# Patient Record
Sex: MALE | Race: OTHER | HISPANIC OR LATINO | URBAN - METROPOLITAN AREA
[De-identification: names, ages, dates, MRNs, and addresses within clinical notes are randomized per-mention and may not be internally consistent; named-entity substitution may affect disease eponyms.]

---

## 2021-12-13 ENCOUNTER — EMERGENCY (EMERGENCY)
Facility: HOSPITAL | Age: 26
LOS: 1 days | Discharge: HOME | End: 2021-12-13
Attending: EMERGENCY MEDICINE
Payer: MEDICAID

## 2021-12-13 VITALS
RESPIRATION RATE: 18 BRPM | HEART RATE: 121 BPM | DIASTOLIC BLOOD PRESSURE: 102 MMHG | OXYGEN SATURATION: 95 % | SYSTOLIC BLOOD PRESSURE: 176 MMHG

## 2021-12-13 LAB
ALP SERPL-CCNC: 118 U/L — SIGNIFICANT CHANGE UP (ref 40–120)
ALT FLD-CCNC: 25 U/L — SIGNIFICANT CHANGE UP (ref 10–45)
ANION GAP SERPL CALC-SCNC: 18 MMOL/L — HIGH (ref 5–17)
APTT BLD: 29.5 SEC — SIGNIFICANT CHANGE UP (ref 27.5–35.5)
AST SERPL-CCNC: 21 U/L — SIGNIFICANT CHANGE UP (ref 10–40)
BASOPHILS # BLD AUTO: 0.03 K/UL — SIGNIFICANT CHANGE UP (ref 0–0.2)
BASOPHILS NFR BLD AUTO: 0.3 % — SIGNIFICANT CHANGE UP (ref 0–2)
BILIRUB SERPL-MCNC: 1 MG/DL — SIGNIFICANT CHANGE UP (ref 0.2–1.2)
BUN SERPL-MCNC: 13 MG/DL — SIGNIFICANT CHANGE UP (ref 7–23)
CALCIUM SERPL-MCNC: 9.4 MG/DL — SIGNIFICANT CHANGE UP (ref 8.4–10.5)
CHLORIDE SERPL-SCNC: 103 MMOL/L — SIGNIFICANT CHANGE UP (ref 96–108)
CO2 SERPL-SCNC: 22 MMOL/L — SIGNIFICANT CHANGE UP (ref 22–31)
CREAT SERPL-MCNC: 0.7 MG/DL — SIGNIFICANT CHANGE UP (ref 0.5–1.3)
EOSINOPHIL # BLD AUTO: 0.17 K/UL — SIGNIFICANT CHANGE UP (ref 0–0.5)
EOSINOPHIL NFR BLD AUTO: 1.7 % — SIGNIFICANT CHANGE UP (ref 0–6)
ETHANOL SERPL-MCNC: 113 MG/DL — HIGH (ref 0–10)
GLUCOSE SERPL-MCNC: 81 MG/DL — SIGNIFICANT CHANGE UP (ref 70–99)
HCT VFR BLD CALC: 48.4 % — SIGNIFICANT CHANGE UP (ref 39–50)
HGB BLD-MCNC: 16.6 G/DL — SIGNIFICANT CHANGE UP (ref 13–17)
IMM GRANULOCYTES NFR BLD AUTO: 0.2 % — SIGNIFICANT CHANGE UP (ref 0–1.5)
INR BLD: 1.01 RATIO — SIGNIFICANT CHANGE UP (ref 0.88–1.16)
LIDOCAIN IGE QN: 26 U/L — SIGNIFICANT CHANGE UP (ref 7–60)
LYMPHOCYTES # BLD AUTO: 3.83 K/UL — HIGH (ref 1–3.3)
LYMPHOCYTES # BLD AUTO: 37.4 % — SIGNIFICANT CHANGE UP (ref 13–44)
MCHC RBC-ENTMCNC: 28.8 PG — SIGNIFICANT CHANGE UP (ref 27–34)
MCHC RBC-ENTMCNC: 34.3 GM/DL — SIGNIFICANT CHANGE UP (ref 32–36)
MCV RBC AUTO: 84 FL — SIGNIFICANT CHANGE UP (ref 80–100)
MONOCYTES # BLD AUTO: 0.69 K/UL — SIGNIFICANT CHANGE UP (ref 0–0.9)
MONOCYTES NFR BLD AUTO: 6.7 % — SIGNIFICANT CHANGE UP (ref 2–14)
NEUTROPHILS # BLD AUTO: 5.5 K/UL — SIGNIFICANT CHANGE UP (ref 1.8–7.4)
NEUTROPHILS NFR BLD AUTO: 53.7 % — SIGNIFICANT CHANGE UP (ref 43–77)
NRBC # BLD: 0 /100 WBCS — SIGNIFICANT CHANGE UP (ref 0–0)
PLATELET # BLD AUTO: 308 K/UL — SIGNIFICANT CHANGE UP (ref 150–400)
POTASSIUM SERPL-MCNC: 3.6 MMOL/L — SIGNIFICANT CHANGE UP (ref 3.5–5.3)
POTASSIUM SERPL-SCNC: 3.6 MMOL/L — SIGNIFICANT CHANGE UP (ref 3.5–5.3)
PROT SERPL-MCNC: 8.4 G/DL — HIGH (ref 6–8.3)
PROTHROM AB SERPL-ACNC: 12.1 SEC — SIGNIFICANT CHANGE UP (ref 10.6–13.6)
RBC # BLD: 5.76 M/UL — SIGNIFICANT CHANGE UP (ref 4.2–5.8)
RBC # FLD: 12.6 % — SIGNIFICANT CHANGE UP (ref 10.3–14.5)
SODIUM SERPL-SCNC: 143 MMOL/L — SIGNIFICANT CHANGE UP (ref 135–145)
WBC # BLD: 10.24 K/UL — SIGNIFICANT CHANGE UP (ref 3.8–10.5)
WBC # FLD AUTO: 10.24 K/UL — SIGNIFICANT CHANGE UP (ref 3.8–10.5)

## 2021-12-13 PROCEDURE — 73562 X-RAY EXAM OF KNEE 3: CPT | Mod: 26,50

## 2021-12-13 PROCEDURE — 74177 CT ABD & PELVIS W/CONTRAST: CPT | Mod: 26,MA

## 2021-12-13 PROCEDURE — 73130 X-RAY EXAM OF HAND: CPT | Mod: 26,LT

## 2021-12-13 PROCEDURE — 73080 X-RAY EXAM OF ELBOW: CPT | Mod: 26,RT

## 2021-12-13 PROCEDURE — 70496 CT ANGIOGRAPHY HEAD: CPT | Mod: 26,MA

## 2021-12-13 PROCEDURE — 70450 CT HEAD/BRAIN W/O DYE: CPT | Mod: 26,MA

## 2021-12-13 PROCEDURE — 73090 X-RAY EXAM OF FOREARM: CPT | Mod: 26,LT

## 2021-12-13 PROCEDURE — 72170 X-RAY EXAM OF PELVIS: CPT | Mod: 26

## 2021-12-13 PROCEDURE — 73110 X-RAY EXAM OF WRIST: CPT | Mod: 26,LT

## 2021-12-13 PROCEDURE — 99284 EMERGENCY DEPT VISIT MOD MDM: CPT

## 2021-12-13 PROCEDURE — 73200 CT UPPER EXTREMITY W/O DYE: CPT | Mod: 26,RT,MA

## 2021-12-13 PROCEDURE — 73090 X-RAY EXAM OF FOREARM: CPT | Mod: 26,LT,77

## 2021-12-13 PROCEDURE — 72131 CT LUMBAR SPINE W/O DYE: CPT | Mod: 26,MA

## 2021-12-13 PROCEDURE — 99291 CRITICAL CARE FIRST HOUR: CPT

## 2021-12-13 PROCEDURE — 71260 CT THORAX DX C+: CPT | Mod: 26,MA

## 2021-12-13 PROCEDURE — 71045 X-RAY EXAM CHEST 1 VIEW: CPT | Mod: 26

## 2021-12-13 PROCEDURE — 70498 CT ANGIOGRAPHY NECK: CPT | Mod: 26,MA

## 2021-12-13 PROCEDURE — 72125 CT NECK SPINE W/O DYE: CPT | Mod: 26,MA

## 2021-12-13 RX ORDER — SODIUM CHLORIDE 9 MG/ML
250 INJECTION INTRAMUSCULAR; INTRAVENOUS; SUBCUTANEOUS ONCE
Refills: 0 | Status: COMPLETED | OUTPATIENT
Start: 2021-12-13 | End: 2021-12-13

## 2021-12-13 RX ORDER — MORPHINE SULFATE 50 MG/1
4 CAPSULE, EXTENDED RELEASE ORAL ONCE
Refills: 0 | Status: DISCONTINUED | OUTPATIENT
Start: 2021-12-13 | End: 2021-12-13

## 2021-12-13 RX ORDER — CEPHALEXIN 500 MG
1 CAPSULE ORAL
Qty: 40 | Refills: 0
Start: 2021-12-13 | End: 2021-12-22

## 2021-12-13 RX ORDER — FENTANYL CITRATE 50 UG/ML
50 INJECTION INTRAVENOUS ONCE
Refills: 0 | Status: DISCONTINUED | OUTPATIENT
Start: 2021-12-13 | End: 2021-12-13

## 2021-12-13 RX ORDER — CEFAZOLIN SODIUM 1 G
2000 VIAL (EA) INJECTION ONCE
Refills: 0 | Status: COMPLETED | OUTPATIENT
Start: 2021-12-13 | End: 2021-12-13

## 2021-12-13 RX ORDER — TETANUS TOXOID, REDUCED DIPHTHERIA TOXOID AND ACELLULAR PERTUSSIS VACCINE, ADSORBED 5; 2.5; 8; 8; 2.5 [IU]/.5ML; [IU]/.5ML; UG/.5ML; UG/.5ML; UG/.5ML
0.5 SUSPENSION INTRAMUSCULAR ONCE
Refills: 0 | Status: COMPLETED | OUTPATIENT
Start: 2021-12-13 | End: 2021-12-13

## 2021-12-13 RX ADMIN — MORPHINE SULFATE 4 MILLIGRAM(S): 50 CAPSULE, EXTENDED RELEASE ORAL at 20:50

## 2021-12-13 RX ADMIN — SODIUM CHLORIDE 250 MILLILITER(S): 9 INJECTION INTRAMUSCULAR; INTRAVENOUS; SUBCUTANEOUS at 19:08

## 2021-12-13 RX ADMIN — Medication 100 MILLIGRAM(S): at 19:01

## 2021-12-13 RX ADMIN — Medication 2000 MILLIGRAM(S): at 19:35

## 2021-12-13 RX ADMIN — FENTANYL CITRATE 50 MICROGRAM(S): 50 INJECTION INTRAVENOUS at 19:01

## 2021-12-13 RX ADMIN — SODIUM CHLORIDE 250 MILLILITER(S): 9 INJECTION INTRAMUSCULAR; INTRAVENOUS; SUBCUTANEOUS at 20:15

## 2021-12-13 RX ADMIN — TETANUS TOXOID, REDUCED DIPHTHERIA TOXOID AND ACELLULAR PERTUSSIS VACCINE, ADSORBED 0.5 MILLILITER(S): 5; 2.5; 8; 8; 2.5 SUSPENSION INTRAMUSCULAR at 19:01

## 2021-12-13 RX ADMIN — FENTANYL CITRATE 50 MICROGRAM(S): 50 INJECTION INTRAVENOUS at 00:00

## 2021-12-13 RX ADMIN — MORPHINE SULFATE 4 MILLIGRAM(S): 50 CAPSULE, EXTENDED RELEASE ORAL at 21:51

## 2021-12-13 NOTE — ED PROVIDER NOTE - PHYSICAL EXAMINATION
A: airway patent  B: bilateral breath sounds present  C: radial and DP 2+ and symmetric  D: GCS 15  E: open fracture to left 5th digit, abrasions over bilateral knees    CONSTITUTIONAL: non-toxic, uncomfortable appearing  SKIN: no rash, no petechiae.  EYES: PERRL, EOMI, pink conjunctiva, anicteric  ENT: tongue and uvular midline, no exudates, moist mucous membranes  NECK: c-collar in place; trachea midline, neck supple  CARD: RRR, no murmurs, equal radial pulses bilaterally 2+  RESP: CTAB, no respiratory distress  ABD: Soft, non-tender, non-distended, no peritoneal signs  EXT: left 5th digit wound with bony exposure. Tender over right elbow/forearm. Tender over bilateral knees, FROM.   SPINE: no midline bony tenderness  NEURO: Alert, oriented. Neuro exam nonfocal.  PSYCH: Cooperative, appropriate.

## 2021-12-13 NOTE — ED PROVIDER NOTE - NSFOLLOWUPINSTRUCTIONS_ED_ALL_ED_FT
Follow up with Dr. Allen within 2 weeks  Rest, drink plenty of fluids  Advance activity as tolerated  Continue all previously prescribed medications as directed  Follow up with your PMD - bring copies of your results  Return to the ER for chest pain, difficulty breathing, signs of infection including redness, swelling, worsening pain, discharge, or other new or concerning symptoms   For pain, you may take Tylenol 650mg every six hours and supplement with ibuprofen 600mg, with food, every six hours which can be taken three hours apart from the Tylenol to have a layered effect.   Take cephalexin 500mg every 6 hours    Seguimiento con el Dr. Allen dentro de 2 semanas  Descanse, dinah muchos líquidos  Actividad avanzada según se tolere  Continúe con todos los medicamentos recetados previamente según las indicaciones  Beltran un seguimiento con motta médico - traiga copias de king resultados  Regrese a la emile de emergencias si tiene dolor en el pecho, dificultad para respirar, signos de infección que incluyen enrojecimiento, hinchazón, empeoramiento del dolor, secreción u otros síntomas nuevos o preocupantes  Para el dolor, puede king Tylenol 650 mg cada seis horas y complementar con ibuprofeno 600 mg, con alimentos, cada seis horas, que puede tomarse con gregory horas de diferencia del Tylenol para tener un efecto en capas.  [place] cefalexina 500 mg cada 6 horas.
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
## 2021-12-13 NOTE — ED PROVIDER NOTE - NS ED ROS FT
Review of Systems    Constitutional: (-) fever, (-) chills  Cardiovascular: (-) chest pain, (-) syncope  Respiratory: (-) cough, (-) shortness of breath  Gastrointestinal: (-) vomiting, (-) diarrhea, (-) abdominal pain  Musculoskeletal: (-) neck pain, (-) back pain, (-) joint pain  Integumentary: (-) rash, (-) edema, (+) wound  Neurological: (-) headache, (-) altered mental status    Except as documented in the HPI, all other systems are negative. Review of Systems    Constitutional: (-) fever, (-) chills  Cardiovascular: (-) chest pain, (-) syncope  Respiratory: (-) cough, (-) shortness of breath  Gastrointestinal: (-) vomiting, (-) diarrhea, (-) abdominal pain  Musculoskeletal: (-) neck pain, (-) back pain, (-) joint pain  Integumentary: (-) rash, (-) edema, (+) wound  Neurological: (-) headache, (-) altered mental status    Except as documented in the HPI, all 10 point review of systems negative.

## 2021-12-13 NOTE — ED PROVIDER NOTE - PROGRESS NOTE DETAILS
Wayne-PGY3: spoke to Dr. Allen ([facility]) re: consult, pending recs. Wayne-PGY3: left 5th digit open dislocation reduced and closed by Dr. Allen (hand plastics), recommending PO abx and outpatient follow up in 2 weeks. pt pending ct / orthopedics to determine if fb present at change of shift to dr veronica Stone MD- Ajk PGY3: CT without fb, fracture.  Discussed with ortho.  Explained results to patient and plan for dc with plastics follow up within 2 weeks and return precautions.  Antibiotics sent.  Patient clinically sober, ambulatory.  Will dc.

## 2021-12-13 NOTE — ED PROVIDER NOTE - WET READ LAUNCH FT
There are 2 Wet Read(s) to document. There are 3 Wet Read(s) to document. There is 1 Wet Read(s) to document. There are no Wet Read(s) to document.
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
## 2021-12-13 NOTE — ED PROVIDER NOTE - CARE PROVIDER_API CALL
Matt Allen)  Plastic Surgery  [address] [facility] [address]  [place] [address]  Phone: [phone]  Fax: [phone]  Follow Up Time:

## 2021-12-13 NOTE — ED PROVIDER NOTE - PATIENT PORTAL LINK FT
You can access the FollowMyHealth Patient Portal offered by [facility] by registering at the following website: [email or web address]. By joining [employer]’s FollowMyHealth portal, you will also be able to view your health information using other applications (apps) compatible with our system.
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
## 2021-12-14 VITALS
HEART RATE: 82 BPM | OXYGEN SATURATION: 97 % | TEMPERATURE: 98 F | SYSTOLIC BLOOD PRESSURE: 129 MMHG | DIASTOLIC BLOOD PRESSURE: 74 MMHG | RESPIRATION RATE: 18 BRPM

## 2021-12-14 PROCEDURE — 70498 CT ANGIOGRAPHY NECK: CPT | Mod: MA

## 2021-12-14 PROCEDURE — 85610 PROTHROMBIN TIME: CPT

## 2021-12-14 PROCEDURE — 73080 X-RAY EXAM OF ELBOW: CPT

## 2021-12-14 PROCEDURE — 85025 COMPLETE CBC W/AUTO DIFF WBC: CPT

## 2021-12-14 PROCEDURE — 73562 X-RAY EXAM OF KNEE 3: CPT

## 2021-12-14 PROCEDURE — 85018 HEMOGLOBIN: CPT

## 2021-12-14 PROCEDURE — 82565 ASSAY OF CREATININE: CPT

## 2021-12-14 PROCEDURE — 85014 HEMATOCRIT: CPT

## 2021-12-14 PROCEDURE — 85730 THROMBOPLASTIN TIME PARTIAL: CPT

## 2021-12-14 PROCEDURE — 96375 TX/PRO/DX INJ NEW DRUG ADDON: CPT | Mod: XU

## 2021-12-14 PROCEDURE — 13131 CMPLX RPR F/C/C/M/N/AX/G/H/F: CPT | Mod: XU

## 2021-12-14 PROCEDURE — 84132 ASSAY OF SERUM POTASSIUM: CPT

## 2021-12-14 PROCEDURE — 96361 HYDRATE IV INFUSION ADD-ON: CPT | Mod: XU

## 2021-12-14 PROCEDURE — 90715 TDAP VACCINE 7 YRS/> IM: CPT

## 2021-12-14 PROCEDURE — 71045 X-RAY EXAM CHEST 1 VIEW: CPT

## 2021-12-14 PROCEDURE — 71260 CT THORAX DX C+: CPT | Mod: MA

## 2021-12-14 PROCEDURE — 82435 ASSAY OF BLOOD CHLORIDE: CPT

## 2021-12-14 PROCEDURE — 73110 X-RAY EXAM OF WRIST: CPT

## 2021-12-14 PROCEDURE — 72170 X-RAY EXAM OF PELVIS: CPT

## 2021-12-14 PROCEDURE — 90471 IMMUNIZATION ADMIN: CPT

## 2021-12-14 PROCEDURE — 70450 CT HEAD/BRAIN W/O DYE: CPT | Mod: MA

## 2021-12-14 PROCEDURE — 70496 CT ANGIOGRAPHY HEAD: CPT | Mod: MA

## 2021-12-14 PROCEDURE — 73090 X-RAY EXAM OF FOREARM: CPT

## 2021-12-14 PROCEDURE — 99291 CRITICAL CARE FIRST HOUR: CPT | Mod: 25

## 2021-12-14 PROCEDURE — 82803 BLOOD GASES ANY COMBINATION: CPT

## 2021-12-14 PROCEDURE — 26755 TREAT FINGER FRACTURE EACH: CPT | Mod: F4

## 2021-12-14 PROCEDURE — 83690 ASSAY OF LIPASE: CPT

## 2021-12-14 PROCEDURE — 80307 DRUG TEST PRSMV CHEM ANLYZR: CPT

## 2021-12-14 PROCEDURE — 82947 ASSAY GLUCOSE BLOOD QUANT: CPT

## 2021-12-14 PROCEDURE — 84295 ASSAY OF SERUM SODIUM: CPT

## 2021-12-14 PROCEDURE — 96365 THER/PROPH/DIAG IV INF INIT: CPT | Mod: XU

## 2021-12-14 PROCEDURE — 73130 X-RAY EXAM OF HAND: CPT

## 2021-12-14 PROCEDURE — 74177 CT ABD & PELVIS W/CONTRAST: CPT | Mod: MA

## 2021-12-14 PROCEDURE — 72125 CT NECK SPINE W/O DYE: CPT | Mod: MA

## 2021-12-14 PROCEDURE — 73200 CT UPPER EXTREMITY W/O DYE: CPT | Mod: MA

## 2021-12-14 PROCEDURE — 83605 ASSAY OF LACTIC ACID: CPT

## 2021-12-14 PROCEDURE — 80053 COMPREHEN METABOLIC PANEL: CPT

## 2021-12-14 PROCEDURE — 82330 ASSAY OF CALCIUM: CPT

## 2021-12-14 RX ORDER — KETOROLAC TROMETHAMINE 30 MG/ML
15 SYRINGE (ML) INJECTION ONCE
Refills: 0 | Status: DISCONTINUED | OUTPATIENT
Start: 2021-12-14 | End: 2021-12-14

## 2021-12-14 RX ORDER — ACETAMINOPHEN 500 MG
975 TABLET ORAL ONCE
Refills: 0 | Status: COMPLETED | OUTPATIENT
Start: 2021-12-14 | End: 2021-12-14

## 2021-12-14 RX ADMIN — Medication 15 MILLIGRAM(S): at 02:06

## 2021-12-14 RX ADMIN — Medication 975 MILLIGRAM(S): at 02:05

## 2022-09-05 ENCOUNTER — EMERGENCY (EMERGENCY)
Facility: HOSPITAL | Age: 27
LOS: 1 days | Discharge: HOME | End: 2022-09-05
Attending: EMERGENCY MEDICINE | Admitting: EMERGENCY MEDICINE

## 2022-09-05 VITALS
TEMPERATURE: 99 F | OXYGEN SATURATION: 99 % | SYSTOLIC BLOOD PRESSURE: 152 MMHG | HEART RATE: 79 BPM | DIASTOLIC BLOOD PRESSURE: 91 MMHG | RESPIRATION RATE: 16 BRPM

## 2022-09-05 PROCEDURE — 73502 X-RAY EXAM HIP UNI 2-3 VIEWS: CPT | Mod: 26,RT

## 2022-09-05 PROCEDURE — 99284 EMERGENCY DEPT VISIT MOD MDM: CPT

## 2022-09-05 RX ORDER — LIDOCAINE 4 G/100G
5 CREAM TOPICAL ONCE
Refills: 0 | Status: COMPLETED | OUTPATIENT
Start: 2022-09-05 | End: 2022-09-05

## 2022-09-05 RX ADMIN — LIDOCAINE 5 MILLILITER(S): 4 CREAM TOPICAL at 13:13

## 2022-09-05 NOTE — ED PROVIDER NOTE - NSFOLLOWUPINSTRUCTIONS_ED_ALL_ED_FT
take over the counter pain medications (such as motrin, ibuprofen, tylenol) as needed for pain.  Advance activity as tolerated.  Continue all previously prescribed medications as directed unless otherwise instructed.  Follow up with your primary care physician and gastroenterologist and ENT (referral list provided or you may call the clinic to make an appointment [phone] ) in 48-72 hours- bring copies of your results.  Return to the ER for worsening or persistent symptoms, and/or ANY NEW OR CONCERNING SYMPTOMS. If you have issues obtaining follow up, please call: [phone] to obtain a doctor or specialist who takes your insurance in your area.  You may call [phone] to make an appointment with the internal medicine clinic. take over the counter pain medications (such as motrin, ibuprofen, tylenol) as needed for pain.  Advance activity as tolerated.  Continue all previously prescribed medications as directed unless otherwise instructed.  Follow up with your primary care physician and gastroenterologist and ENT (referral list provided or you may call the clinic to make an appointment [phone] ) in 48-72 hours- bring copies of your results.  Return to the ER for worsening or persistent symptoms, and/or ANY NEW OR CONCERNING SYMPTOMS. If you have issues obtaining follow up, please call: [phone] to obtain a doctor or specialist who takes your insurance in your area.  You may call [phone] to make an appointment with the internal medicine clinic. take over the counter pain medications (such as motrin, ibuprofen, tylenol) as needed for pain.  Advance activity as tolerated.  Continue all previously prescribed medications as directed unless otherwise instructed.  Follow up with your primary care physician and gastroenterologist and ENT (referral list provided or you may call the clinic to make an appointment [phone] ) in 48-72 hours- bring copies of your results.  Return to the ER for worsening or persistent symptoms, and/or ANY NEW OR CONCERNING SYMPTOMS. If you have issues obtaining follow up, please call: [phone] to obtain a doctor or specialist who takes your insurance in your area.  You may call [phone] to make an appointment with the internal medicine clinic. take over the counter pain medications (such as motrin, ibuprofen, tylenol) as needed for pain.  take omeprazole for acid reflux as needed for acid reflux.  Advance activity as tolerated.  Continue all previously prescribed medications as directed unless otherwise instructed.  Follow up with your primary care physician and gastroenterologist and ENT (referral list provided or you may call the clinic to make an appointment [phone] ) in 48-72 hours- bring copies of your results.  Return to the ER for worsening or persistent symptoms, and/or ANY NEW OR CONCERNING SYMPTOMS. If you have issues obtaining follow up, please call: [phone] to obtain a doctor or specialist who takes your insurance in your area.  You may call [phone] to make an appointment with the internal medicine clinic.    [facility] - ENT  Otolaryngology (ENT)  [address] [place] [address]  Phone: [phone]    Gurmeet Martinez  OTOLARYNGOLOGY  [address] [place] [address], [address]  [place] [address]  Phone: [phone]  Fax: [phone]  Established Patient    Pharyngitis  WHAT YOU NEED TO KNOW:  Pharyngitis, or sore throat, is inflammation of the tissues and structures in your pharynx (throat). Pharyngitis is most often caused by bacteria. It may also be caused by a cold or flu virus. Other causes include smoking, allergies, or acid reflux.   DISCHARGE INSTRUCTIONS:  Call 911 for any of the following:   •You have trouble breathing or swallowing because your throat is swollen or sore.  Return to the emergency department if:   •You are drooling because it hurts too much to swallow.  •Your fever is higher than 102°F (39°C) or lasts longer than 3 days.  •You are confused.  •You taste blood in your throat.  Contact your healthcare provider if:   •Your throat pain gets worse.  •You have a painful lump in your throat that does not go away after 5 days.  •Your symptoms do not improve after 5 days.  •You have questions or concerns about your condition or care.  Medicines: Viral pharyngitis will go away on its own without treatment. Your sore throat should start to feel better in 3 to 5 days for both viral and bacterial infections. You may need any of the following:   •Antibiotics treat a bacterial infection.  •NSAIDs, such as ibuprofen, help decrease swelling, pain, and fever. NSAIDs can cause stomach bleeding or kidney problems in certain people. If you take blood thinner medicine, always ask your healthcare provider if NSAIDs are safe for you. Always read the medicine label and follow directions.  •Acetaminophen decreases pain and fever. It is available without a doctor's order. Ask how much to take and how often to take it. Follow directions. Acetaminophen can cause liver damage if not taken correctly.  •Take your medicine as directed. Contact your healthcare provider if you think your medicine is not helping or if you have side effects. Tell him or her if you are allergic to any medicine. Keep a list of the medicines, vitamins, and herbs you take. Include the amounts, and when and why you take them. Bring the list or the pill bottles to follow-up visits. Carry your medicine list with you in case of an emergency.  Manage your symptoms:   •Gargle salt water. Mix ¼ teaspoon salt in an 8 ounce glass of warm water and gargle. This may help decrease swelling in your throat.  •Drink liquids as directed. You may need to drink more liquids than usual. Liquids may help soothe your throat and prevent dehydration. Ask how much liquid to drink each day and which liquids are best for you.  •Use a cool-steam humidifier to help moisten the air in your room and calm your cough.  •Soothe your throat with cough drops, ice, soft foods, or popsicles.  Prevent the spread of pharyngitis: Cover your mouth and nose when you cough or sneeze. Do not share food or drinks. Wash your hands often. Use soap and water. If soap and water are unavailable, use an alcohol based hand .   Follow up with your healthcare provider as directed: Write down your questions so you remember to ask them during your visits.   Faringitis  LO QUE NECESITA SABER:  La faringitis o dolor de garganta es la inflamación de los tejidos y estructuras en motta faringe (garganta). La faringitis es generalmente causada por angelo bacteria. También podría ser causada por un resfriado o el virus de la gripe. Otras causas incluyen el fumar, las alergias o el reflujo estomacal.  INSTRUCCIONES SOBRE EL IRENE HOSPITALARIA:  Llame al 911 en jenny de presentar lo siguiente:  •Usted tiene dificultad para respirar o tragar porque motta garganta está inflamada o adolorida.  Regrese a la emile de emergencias si:  •Usted está babeando porque le duele demasiado tragar.  •Usted tiene fiebre por encima de 102°F (39°C) o le dura más de 3 días.  •Usted está confundido.  •Usted siente sabor a martín en motta garganta  Comuníquese con motta médico si:  •Motta dolor de garganta empeora.  •Usted tiene un bulto adolorido en motta garganta que no se candi después de 5 días.  •Ankita síntomas no mejoran después de 5 días.  •Usted tiene preguntas o inquietudes acerca de motta condición o cuidado.  Medicamentos:La faringitis viral desaparecerá por sí luke sin necesidad de tratamiento. Motta dolor de garganta empezará a mejorar dentro de 3 a 5 días en ambos casos de infecciones virales o bacteriales. Es posible que usted necesite alguno de los siguientes:   •Los antibióticostratan las infecciones bacterianas.  •Los WILMAR,maddi el ibuprofeno, ayudan a disminuir la inflamación, el dolor y la fiebre. Los WILMAR pueden causar sangrado estomacal o problemas renales en ciertas personas. Si usted gely un medicamento anticoagulante, siempre pregúntele a motta médico si los WILMAR son seguros para usted. Siempre miguel angel la etiqueta de alyce medicamento y siga las instrucciones.  •Acetaminofénalivia el dolor y baja la fiebre. Está disponible sin receta médica. Pregunte la cantidad y la frecuencia con que debe tomarlos. Siga las indicaciones. El acetaminofén puede causar daño en el hígado cuando no se gely de forma correcta.  •Bemiss ankita medicamentos maddi se le haya indicado.Consulte con motta médico si usted martita que motta medicamento no le está ayudando o si presenta efectos secundarios. Infórmele si es alérgico a cualquier medicamento. Mantenga angelo lista actualizada de los medicamentos, las vitaminas y los productos herbales que gely. Incluya los siguientes datos de los medicamentos: cantidad, frecuencia y motivo de administración. Traiga con usted la lista o los envases de las píldoras a ankita citas de seguimiento. Lleve la lista de los medicamentos con usted en jenny de angelo emergencia.  El manejo de ankita síntomas:  •Vanessa gárgaras de agua con sal.Mezcle ¼ de cucharadita de sal en un vaso con 8 onzas de agua tibia y vanessa gárgaras. [place] podría ayudar a reducir la inflamación en motta garganta.  •Bemiss líquidos maddi se le haya indicado.Es posible que usted necesite ingerir más líquidos de lo habitual. Los líquidos pueden ayudar a aliviar motta garganta y prevenir la deshidratación. Pregunte cuánto líquido debe king cada día y cuáles líquidos son los más adecuados para usted.  •Use un humidificador de vapor fríopara ayudar a humedecer el aire en motta habitación y aliviar motta tos.  •Alivie motta gargantacon pastillas para la tos, hielo y alimentos blandos o helados de agua.  Prevenga la propagación de la faringitis:Cúbrase la boca y nariz cuando tose o estornuda. No comparta alimentos o bebidas. Lávese las hamilton frecuentemente. Utilice agua y jabón. Si no tiene agua y jabón disponibles, entonces puede usar un alcohol para hamilton en gel.  Acuda a ankita consultas de control con motta médico según le indicaron.Anote ankita preguntas para que se acuerde de hacerlas reyna ankita visitas. take over the counter pain medications (such as motrin, ibuprofen, tylenol) as needed for pain.  take omeprazole for acid reflux as needed for acid reflux.  Advance activity as tolerated.  Continue all previously prescribed medications as directed unless otherwise instructed.  Follow up with your primary care physician and gastroenterologist and ENT (referral list provided or you may call the clinic to make an appointment [phone] ) in 48-72 hours- bring copies of your results.  Return to the ER for worsening or persistent symptoms, and/or ANY NEW OR CONCERNING SYMPTOMS. If you have issues obtaining follow up, please call: [phone] to obtain a doctor or specialist who takes your insurance in your area.  You may call [phone] to make an appointment with the internal medicine clinic.    [facility] - ENT  Otolaryngology (ENT)  [address] [place] [address]  Phone: [phone]    Gurmeet Martinez  OTOLARYNGOLOGY  [address] [place] [address], [address]  [place] [address]  Phone: [phone]  Fax: [phone]  Established Patient    Pharyngitis  WHAT YOU NEED TO KNOW:  Pharyngitis, or sore throat, is inflammation of the tissues and structures in your pharynx (throat). Pharyngitis is most often caused by bacteria. It may also be caused by a cold or flu virus. Other causes include smoking, allergies, or acid reflux.   DISCHARGE INSTRUCTIONS:  Call 911 for any of the following:   •You have trouble breathing or swallowing because your throat is swollen or sore.  Return to the emergency department if:   •You are drooling because it hurts too much to swallow.  •Your fever is higher than 102°F (39°C) or lasts longer than 3 days.  •You are confused.  •You taste blood in your throat.  Contact your healthcare provider if:   •Your throat pain gets worse.  •You have a painful lump in your throat that does not go away after 5 days.  •Your symptoms do not improve after 5 days.  •You have questions or concerns about your condition or care.  Medicines: Viral pharyngitis will go away on its own without treatment. Your sore throat should start to feel better in 3 to 5 days for both viral and bacterial infections. You may need any of the following:   •Antibiotics treat a bacterial infection.  •NSAIDs, such as ibuprofen, help decrease swelling, pain, and fever. NSAIDs can cause stomach bleeding or kidney problems in certain people. If you take blood thinner medicine, always ask your healthcare provider if NSAIDs are safe for you. Always read the medicine label and follow directions.  •Acetaminophen decreases pain and fever. It is available without a doctor's order. Ask how much to take and how often to take it. Follow directions. Acetaminophen can cause liver damage if not taken correctly.  •Take your medicine as directed. Contact your healthcare provider if you think your medicine is not helping or if you have side effects. Tell him or her if you are allergic to any medicine. Keep a list of the medicines, vitamins, and herbs you take. Include the amounts, and when and why you take them. Bring the list or the pill bottles to follow-up visits. Carry your medicine list with you in case of an emergency.  Manage your symptoms:   •Gargle salt water. Mix ¼ teaspoon salt in an 8 ounce glass of warm water and gargle. This may help decrease swelling in your throat.  •Drink liquids as directed. You may need to drink more liquids than usual. Liquids may help soothe your throat and prevent dehydration. Ask how much liquid to drink each day and which liquids are best for you.  •Use a cool-steam humidifier to help moisten the air in your room and calm your cough.  •Soothe your throat with cough drops, ice, soft foods, or popsicles.  Prevent the spread of pharyngitis: Cover your mouth and nose when you cough or sneeze. Do not share food or drinks. Wash your hands often. Use soap and water. If soap and water are unavailable, use an alcohol based hand .   Follow up with your healthcare provider as directed: Write down your questions so you remember to ask them during your visits.   Faringitis  LO QUE NECESITA SABER:  La faringitis o dolor de garganta es la inflamación de los tejidos y estructuras en motta faringe (garganta). La faringitis es generalmente causada por angelo bacteria. También podría ser causada por un resfriado o el virus de la gripe. Otras causas incluyen el fumar, las alergias o el reflujo estomacal.  INSTRUCCIONES SOBRE EL IRENE HOSPITALARIA:  Llame al 911 en jenny de presentar lo siguiente:  •Usted tiene dificultad para respirar o tragar porque motta garganta está inflamada o adolorida.  Regrese a la emile de emergencias si:  •Usted está babeando porque le duele demasiado tragar.  •Usted tiene fiebre por encima de 102°F (39°C) o le dura más de 3 días.  •Usted está confundido.  •Usted siente sabor a martín en motta garganta  Comuníquese con motta médico si:  •Motta dolor de garganta empeora.  •Usted tiene un bulto adolorido en motta garganta que no se candi después de 5 días.  •Ankita síntomas no mejoran después de 5 días.  •Usted tiene preguntas o inquietudes acerca de motta condición o cuidado.  Medicamentos:La faringitis viral desaparecerá por sí luke sin necesidad de tratamiento. Motta dolor de garganta empezará a mejorar dentro de 3 a 5 días en ambos casos de infecciones virales o bacteriales. Es posible que usted necesite alguno de los siguientes:   •Los antibióticostratan las infecciones bacterianas.  •Los WILMAR,maddi el ibuprofeno, ayudan a disminuir la inflamación, el dolor y la fiebre. Los WILMAR pueden causar sangrado estomacal o problemas renales en ciertas personas. Si usted gely un medicamento anticoagulante, siempre pregúntele a motta médico si los WILMAR son seguros para usted. Siempre miguel angel la etiqueta de alyce medicamento y siga las instrucciones.  •Acetaminofénalivia el dolor y baja la fiebre. Está disponible sin receta médica. Pregunte la cantidad y la frecuencia con que debe tomarlos. Siga las indicaciones. El acetaminofén puede causar daño en el hígado cuando no se gely de forma correcta.  •Catron ankita medicamentos maddi se le haya indicado.Consulte con motta médico si usted martita que motta medicamento no le está ayudando o si presenta efectos secundarios. Infórmele si es alérgico a cualquier medicamento. Mantenga angelo lista actualizada de los medicamentos, las vitaminas y los productos herbales que gely. Incluya los siguientes datos de los medicamentos: cantidad, frecuencia y motivo de administración. Traiga con usted la lista o los envases de las píldoras a ankita citas de seguimiento. Lleve la lista de los medicamentos con usted en jenny de angelo emergencia.  El manejo de ankita síntomas:  •Vanessa gárgaras de agua con sal.Mezcle ¼ de cucharadita de sal en un vaso con 8 onzas de agua tibia y vanessa gárgaras. [place] podría ayudar a reducir la inflamación en motta garganta.  •Catron líquidos maddi se le haya indicado.Es posible que usted necesite ingerir más líquidos de lo habitual. Los líquidos pueden ayudar a aliviar motta garganta y prevenir la deshidratación. Pregunte cuánto líquido debe king cada día y cuáles líquidos son los más adecuados para usted.  •Use un humidificador de vapor fríopara ayudar a humedecer el aire en motta habitación y aliviar motta tos.  •Alivie motta gargantacon pastillas para la tos, hielo y alimentos blandos o helados de agua.  Prevenga la propagación de la faringitis:Cúbrase la boca y nariz cuando tose o estornuda. No comparta alimentos o bebidas. Lávese las hamilton frecuentemente. Utilice agua y jabón. Si no tiene agua y jabón disponibles, entonces puede usar un alcohol para hamilton en gel.  Acuda a ankita consultas de control con motta médico según le indicaron.Anote ankita preguntas para que se acuerde de hacerlas reyna ankita visitas. take over the counter pain medications (such as motrin, ibuprofen, tylenol) as needed for pain.  take omeprazole for acid reflux as needed for acid reflux.  Advance activity as tolerated.  Continue all previously prescribed medications as directed unless otherwise instructed.  Follow up with your primary care physician and gastroenterologist and ENT (referral list provided or you may call the clinic to make an appointment [phone] ) in 48-72 hours- bring copies of your results.  Return to the ER for worsening or persistent symptoms, and/or ANY NEW OR CONCERNING SYMPTOMS. If you have issues obtaining follow up, please call: [phone] to obtain a doctor or specialist who takes your insurance in your area.  You may call [phone] to make an appointment with the internal medicine clinic.    [facility] - ENT  Otolaryngology (ENT)  [address] [place] [address]  Phone: [phone]    Gurmeet Martinez  OTOLARYNGOLOGY  [address] [place] [address], [address]  [place] [address]  Phone: [phone]  Fax: [phone]  Established Patient    Pharyngitis  WHAT YOU NEED TO KNOW:  Pharyngitis, or sore throat, is inflammation of the tissues and structures in your pharynx (throat). Pharyngitis is most often caused by bacteria. It may also be caused by a cold or flu virus. Other causes include smoking, allergies, or acid reflux.   DISCHARGE INSTRUCTIONS:  Call 911 for any of the following:   •You have trouble breathing or swallowing because your throat is swollen or sore.  Return to the emergency department if:   •You are drooling because it hurts too much to swallow.  •Your fever is higher than 102°F (39°C) or lasts longer than 3 days.  •You are confused.  •You taste blood in your throat.  Contact your healthcare provider if:   •Your throat pain gets worse.  •You have a painful lump in your throat that does not go away after 5 days.  •Your symptoms do not improve after 5 days.  •You have questions or concerns about your condition or care.  Medicines: Viral pharyngitis will go away on its own without treatment. Your sore throat should start to feel better in 3 to 5 days for both viral and bacterial infections. You may need any of the following:   •Antibiotics treat a bacterial infection.  •NSAIDs, such as ibuprofen, help decrease swelling, pain, and fever. NSAIDs can cause stomach bleeding or kidney problems in certain people. If you take blood thinner medicine, always ask your healthcare provider if NSAIDs are safe for you. Always read the medicine label and follow directions.  •Acetaminophen decreases pain and fever. It is available without a doctor's order. Ask how much to take and how often to take it. Follow directions. Acetaminophen can cause liver damage if not taken correctly.  •Take your medicine as directed. Contact your healthcare provider if you think your medicine is not helping or if you have side effects. Tell him or her if you are allergic to any medicine. Keep a list of the medicines, vitamins, and herbs you take. Include the amounts, and when and why you take them. Bring the list or the pill bottles to follow-up visits. Carry your medicine list with you in case of an emergency.  Manage your symptoms:   •Gargle salt water. Mix ¼ teaspoon salt in an 8 ounce glass of warm water and gargle. This may help decrease swelling in your throat.  •Drink liquids as directed. You may need to drink more liquids than usual. Liquids may help soothe your throat and prevent dehydration. Ask how much liquid to drink each day and which liquids are best for you.  •Use a cool-steam humidifier to help moisten the air in your room and calm your cough.  •Soothe your throat with cough drops, ice, soft foods, or popsicles.  Prevent the spread of pharyngitis: Cover your mouth and nose when you cough or sneeze. Do not share food or drinks. Wash your hands often. Use soap and water. If soap and water are unavailable, use an alcohol based hand .   Follow up with your healthcare provider as directed: Write down your questions so you remember to ask them during your visits.   Faringitis  LO QUE NECESITA SABER:  La faringitis o dolor de garganta es la inflamación de los tejidos y estructuras en motta faringe (garganta). La faringitis es generalmente causada por angelo bacteria. También podría ser causada por un resfriado o el virus de la gripe. Otras causas incluyen el fumar, las alergias o el reflujo estomacal.  INSTRUCCIONES SOBRE EL IRENE HOSPITALARIA:  Llame al 911 en jenny de presentar lo siguiente:  •Usted tiene dificultad para respirar o tragar porque motta garganta está inflamada o adolorida.  Regrese a la emile de emergencias si:  •Usted está babeando porque le duele demasiado tragar.  •Usted tiene fiebre por encima de 102°F (39°C) o le dura más de 3 días.  •Usted está confundido.  •Usted siente sabor a martín en motta garganta  Comuníquese con motta médico si:  •Motta dolor de garganta empeora.  •Usted tiene un bulto adolorido en motta garganta que no se candi después de 5 días.  •Ankita síntomas no mejoran después de 5 días.  •Usted tiene preguntas o inquietudes acerca de motta condición o cuidado.  Medicamentos:La faringitis viral desaparecerá por sí luke sin necesidad de tratamiento. Motta dolor de garganta empezará a mejorar dentro de 3 a 5 días en ambos casos de infecciones virales o bacteriales. Es posible que usted necesite alguno de los siguientes:   •Los antibióticostratan las infecciones bacterianas.  •Los WILMAR,maddi el ibuprofeno, ayudan a disminuir la inflamación, el dolor y la fiebre. Los WILMAR pueden causar sangrado estomacal o problemas renales en ciertas personas. Si usted gely un medicamento anticoagulante, siempre pregúntele a motta médico si los WILMAR son seguros para usted. Siempre miguel angel la etiqueta de alyce medicamento y siga las instrucciones.  •Acetaminofénalivia el dolor y baja la fiebre. Está disponible sin receta médica. Pregunte la cantidad y la frecuencia con que debe tomarlos. Siga las indicaciones. El acetaminofén puede causar daño en el hígado cuando no se gely de forma correcta.  •[place] ankita medicamentos maddi se le haya indicado.Consulte con motta médico si usted martita que motta medicamento no le está ayudando o si presenta efectos secundarios. Infórmele si es alérgico a cualquier medicamento. Mantenga angelo lista actualizada de los medicamentos, las vitaminas y los productos herbales que gely. Incluya los siguientes datos de los medicamentos: cantidad, frecuencia y motivo de administración. Traiga con usted la lista o los envases de las píldoras a ankita citas de seguimiento. Lleve la lista de los medicamentos con usted en jenny de angelo emergencia.  El manejo de ankita síntomas:  •Vanessa gárgaras de agua con sal.Mezcle ¼ de cucharadita de sal en un vaso con 8 onzas de agua tibia y vanessa gárgaras. [place] podría ayudar a reducir la inflamación en motta garganta.  •[place] líquidos maddi se le haya indicado.Es posible que usted necesite ingerir más líquidos de lo habitual. Los líquidos pueden ayudar a aliviar motta garganta y prevenir la deshidratación. Pregunte cuánto líquido debe king cada día y cuáles líquidos son los más adecuados para usted.  •Use un humidificador de vapor fríopara ayudar a humedecer el aire en motta habitación y aliviar motta tos.  •Alivie motta gargantacon pastillas para la tos, hielo y alimentos blandos o helados de agua.  Prevenga la propagación de la faringitis:Cúbrase la boca y nariz cuando tose o estornuda. No comparta alimentos o bebidas. Lávese las hamilton frecuentemente. Utilice agua y jabón. Si no tiene agua y jabón disponibles, entonces puede usar un alcohol para hamilton en gel.  Acuda a ankita consultas de control con motta médico según le indicaron.Anote ankita preguntas para que se acuerde de hacerlas reyna ankita visitas.

## 2022-09-05 NOTE — ED PROVIDER NOTE - PATIENT PORTAL LINK FT
You can access the FollowMyHealth Patient Portal offered by [facility] by registering at the following website: [email or web address]. By joining [employer]’s FollowMyHealth portal, you will also be able to view your health information using other applications (apps) compatible with our system. You can access the FollowMyHealth Patient Portal offered by [facility] by registering at the following website: [email or web address]. By joining [employer]’s FollowMyHealth portal, you will also be able to view your health information using other applications (apps) compatible with our system. You can access the FollowMyHealth Patient Portal offered by [facility] by registering at the following website: [email or web address]. By joining [employer]’s FollowMyHealth portal, you will also be able to view your health information using other applications (apps) compatible with our system.

## 2022-09-05 NOTE — ED PROVIDER NOTE - ATTENDING APP SHARED VISIT CONTRIBUTION OF CARE
The patient is a 27y Male who denies any past medical and surgery history PTED with multiple minor complaints as described including subacute throat pain without any "red flags" and intermittent right hip pain      Vital Signs Stable  PE: as described; my additions and exceptions are noted in the chart     IMPRESSION/RISK:  Dx=Sore throat with cough >night doubt asthma; probably GERD or UR source would emphasize tx of both Hip Xrays negative   Plan  Outpatient followup and RTED PRN

## 2022-09-05 NOTE — ED ADULT NURSE REASSESSMENT NOTE - STATUS
for discharge, pt not found in ED by Provider, Called pt at home states he didn't feel well and left. will come back tonight for dc instructions

## 2022-09-05 NOTE — ED PROVIDER NOTE - OBJECTIVE STATEMENT
28 yo M with no PMH, c/o sore throat for 3 weeks. painful when swallowing. sometimes associated with cough at night. denies fever, runny nose, sob, chest pain. current smoker.  pt also c/o right hip pain for 3 weeks. denies trauma. pt is able to walk but with pain. pain is worse with running sometimes. denies dysuria, hematuria, abd pain, NVD. 26 yo M with no PMH, c/o sore throat for 3 weeks. painful when swallowing. sometimes associated with cough at night. denies fever, runny nose, sob, chest pain. current smoker.  pt also c/o right hip pain for 3 weeks. denies trauma. pt is able to walk but with pain. pain is worse with running sometimes. denies dysuria, hematuria, abd pain, NVD.

## 2022-09-05 NOTE — ED PROVIDER NOTE - PHYSICAL EXAMINATION
CONSTITUTIONAL: Well-appearing; well-nourished; in no apparent distress. Non-toxic appearing.   NEURO: Alert & oriented. Gait steady without assistance. Sensory and motor functions are grossly intact.  PSYCH: Mood appropriate. Thought processes intact.   NECK: Supple  PHARYNX: mildly enlarged tonsils B/L. uvula midline, no exudates.  CARD: Regular rate and rhythm, no murmurs  RESP: No accessory muscle use; breath sounds clear and equal bilaterally; no wheezes, rhonchi, or rales     ABD: Soft; non-distended; non-tender.   MUSCULOSKELETAL/EXTREMITIES: FROM in all four extremities; no extremity edema. weight bearing.   SKIN: Warm; dry; no apparent lesions or exudate

## 2022-09-05 NOTE — ED PROVIDER NOTE - CLINICAL SUMMARY MEDICAL DECISION MAKING FREE TEXT BOX
28 yo M here for sore throat for 3 weeks. also c/o right hip pain intermittently for a while which is worse with movement. exam found tonsils are enlarged B/L with no exudate. uvula midline. right hip has full ROM, and fully weight bearing.   no concern for acute fracture. likely arthritis on right hip. xray of right hip ordered.  no concern for PTA or retropharyngeal abscess. possible GERD vs chronic pharyngitis as pt is a smoker.  treat with lido viscous and ENT and GI f/u. 26 yo M here for sore throat for 3 weeks. also c/o right hip pain intermittently for a while which is worse with movement. exam found tonsils are enlarged B/L with no exudate. uvula midline. right hip has full ROM, and fully weight bearing.   no concern for acute fracture. likely arthritis on right hip. xray of right hip ordered.  no concern for PTA or retropharyngeal abscess. possible GERD vs chronic pharyngitis as pt is a smoker.  treat with lido viscous and ENT and GI f/u.

## 2023-12-18 PROBLEM — Z78.9 OTHER SPECIFIED HEALTH STATUS: Chronic | Status: ACTIVE | Noted: 2021-12-13
